# Patient Record
Sex: MALE | Race: WHITE | URBAN - NONMETROPOLITAN AREA
[De-identification: names, ages, dates, MRNs, and addresses within clinical notes are randomized per-mention and may not be internally consistent; named-entity substitution may affect disease eponyms.]

---

## 2018-03-01 ENCOUNTER — OFFICE VISIT (OUTPATIENT)
Dept: PRIMARY CARE CLINIC | Age: 63
End: 2018-03-01
Payer: COMMERCIAL

## 2018-03-01 VITALS
DIASTOLIC BLOOD PRESSURE: 78 MMHG | TEMPERATURE: 97.6 F | BODY MASS INDEX: 30.52 KG/M2 | OXYGEN SATURATION: 96 % | HEIGHT: 71 IN | RESPIRATION RATE: 18 BRPM | SYSTOLIC BLOOD PRESSURE: 122 MMHG | HEART RATE: 63 BPM | WEIGHT: 218 LBS

## 2018-03-01 DIAGNOSIS — Z76.89 ENCOUNTER TO ESTABLISH CARE: ICD-10-CM

## 2018-03-01 DIAGNOSIS — Z00.00 WELLNESS EXAMINATION: ICD-10-CM

## 2018-03-01 DIAGNOSIS — Z12.5 SCREENING FOR PROSTATE CANCER: ICD-10-CM

## 2018-03-01 DIAGNOSIS — Z13.220 SCREENING CHOLESTEROL LEVEL: ICD-10-CM

## 2018-03-01 PROCEDURE — 99386 PREV VISIT NEW AGE 40-64: CPT | Performed by: NURSE PRACTITIONER

## 2018-03-01 NOTE — PROGRESS NOTES
OFFICE VISIT:  3/4/18    Kaylin Hanna - : 1955      I appreciate the opportunity of participating in the care of Kaylin Hanna. He is a very pleasant 65 y.o. male who I had the opportunity of seeing in my office today, 3/4/18. Subjective  Aide Stockton normally goes by the name Polly Haddad, he is a pleasant cooperative 70-year-old male who reports overall good health. Patient tells me at one point he did have some concerns with borderline hypertension, but he started watching his sodium exercising and has not had any additional elevated blood pressure readings. He has no exertional chest pain, pressure, burning or squeezing. He is able to lie flat without evidence of orthopnea or paroxysmal nocturnal dyspnea. No symptomatic tachy- or tony-arrhythmia. No numbness or weakness to suggest cerebrovascular accident or transient ischemic attack. Patient does have several skin lesions likely associated with sun exposure. He has had somatic keratosis removed and does have several benign appearing lesions on his for head and upper body. I did recommend a few of them to be watched closely and maybe consider dermatology evaluation. The patient does not smoke or drink alcohol. He does not have any other significant medical history or family history. He did have a right total hip replaced in  by Dr. Cyndee Benton and has not had any additional concerns. He is very active and exercises every day. And he is adherent to a healthy heart diet. Patient has not had any recent lab work and would like to have basic lab work completed today. Kaylin Hanna has the following history as recorded in [employer]: There are no active problems to display for this patient.     Past Medical History:   Diagnosis Date    Acid reflux     Actinic keratosis     History of elevated blood pressure while in hospital      Past Surgical History:   Procedure Laterality Date    JOINT REPLACEMENT Right 2008    Right Hip replacement / Sara Rubalcava [facility]uy     Family History   Problem Relation Age of Onset    Stroke Mother     Cancer Father      Thyroid     Social History   Substance Use Topics    Smoking status: Former Smoker     Types: Cigarettes    Smokeless tobacco: Never Used    Alcohol use No        No current outpatient prescriptions on file. No current facility-administered medications for this visit. Allergies: Patient has no known allergies. Objective  Vital Signs - /78   Pulse 63   Temp 97.6 °F (36.4 °C) (Temporal)   Resp 18   Ht 5' 10.5\" (1.791 m)   Wt 218 lb (98.9 kg)   SpO2 96%   BMI 30.84 kg/m²   General  Félix Braun is in no apparent distress. HEENT  The head normocephalic. Neck - Supple, without increased jugular venous pressure. Lungs - The lungs are clear. Cardiovascular  Rhythm is regular without appreciable murmur or rub. Extremities - There is no cyanosis, clubbing, or significant edema. Pulmonary  Effort appears normal.  Breath sounds normal.  No wheezes or rales. Abdomen  Soft, nontender, and nondistended. Bowel sounds are intact. Musculoskeletal  ROM appears normal.   Neurological  Cranial nerves II through XII are grossly intact. The patient moves all four extremities without hesitancy. Assessment:     1. Wellness examination  CBC    Comprehensive Metabolic Panel   2. Screening cholesterol level  Lipid Panel    PSA Screening   3. Screening for prostate cancer  PSA Screening   4. Encounter to establish care         Plan  1. Return for wellness visit in a year. 2. Lab work is ordered below  3. Continue 30 minutes of low impact aerobic exercise daily  4. Healthy heart diet  5. Avoid sun exposure, use sunscreen and wear a hat or appropriate clothing with potential sun exposure  6. Call with any problems, questions or concerns. Félix Braun was seen today for established new doctor. Diagnoses and all orders for this visit:    Wellness examination  -     CBC;  Future  - Comprehensive Metabolic Panel; Future    Screening cholesterol level  -     Lipid Panel; Future  -     PSA Screening; Future    Screening for prostate cancer  -     PSA Screening;  Future    Encounter to establish care      I appreciate the opportunity of participating in the care and treatment of this patient.       :  Renata Garcia, APRN

## 2018-03-05 ENCOUNTER — TELEPHONE (OUTPATIENT)
Dept: PRIMARY CARE CLINIC | Age: 63
End: 2018-03-05

## 2018-03-05 DIAGNOSIS — S33.5XXS LUMBAR SPRAIN, SEQUELA: Primary | ICD-10-CM

## 2018-03-05 RX ORDER — BACLOFEN 10 MG/1
10 TABLET ORAL 3 TIMES DAILY
Qty: 30 TABLET | Refills: 1 | Status: SHIPPED | OUTPATIENT
Start: 2018-03-05 | End: 2018-03-15

## 2018-03-05 RX ORDER — DICLOFENAC POTASSIUM 50 MG/1
50 TABLET, FILM COATED ORAL 3 TIMES DAILY
Qty: 30 TABLET | Refills: 3 | Status: SHIPPED | OUTPATIENT
Start: 2018-03-05 | End: 2019-04-09 | Stop reason: SDUPTHER

## 2018-04-04 DIAGNOSIS — Z12.5 SCREENING FOR PROSTATE CANCER: ICD-10-CM

## 2018-04-04 DIAGNOSIS — Z13.220 SCREENING CHOLESTEROL LEVEL: ICD-10-CM

## 2018-04-04 DIAGNOSIS — Z00.00 WELLNESS EXAMINATION: ICD-10-CM

## 2018-04-04 LAB
ALBUMIN SERPL-MCNC: 4.1 G/DL (ref 3.5–5.2)
ALP BLD-CCNC: 56 U/L (ref 40–130)
ALT SERPL-CCNC: 30 U/L (ref 5–41)
ANION GAP SERPL CALCULATED.3IONS-SCNC: 12 MMOL/L (ref 7–19)
AST SERPL-CCNC: 21 U/L (ref 5–40)
BILIRUB SERPL-MCNC: 0.3 MG/DL (ref 0.2–1.2)
BUN BLDV-MCNC: 12 MG/DL (ref 8–23)
CALCIUM SERPL-MCNC: 8.9 MG/DL (ref 8.8–10.2)
CHLORIDE BLD-SCNC: 102 MMOL/L (ref 98–111)
CHOLESTEROL, TOTAL: 187 MG/DL (ref 160–199)
CO2: 26 MMOL/L (ref 22–29)
CREAT SERPL-MCNC: 0.8 MG/DL (ref 0.5–1.2)
GFR NON-AFRICAN AMERICAN: >60
GLUCOSE BLD-MCNC: 121 MG/DL (ref 74–109)
HCT VFR BLD CALC: 46.9 % (ref 42–52)
HDLC SERPL-MCNC: 35 MG/DL (ref 55–121)
HEMOGLOBIN: 15.4 G/DL (ref 14–18)
LDL CHOLESTEROL CALCULATED: 118 MG/DL
MCH RBC QN AUTO: 29.7 PG (ref 27–31)
MCHC RBC AUTO-ENTMCNC: 32.8 G/DL (ref 33–37)
MCV RBC AUTO: 90.5 FL (ref 80–94)
PDW BLD-RTO: 11.9 % (ref 11.5–14.5)
PLATELET # BLD: 321 K/UL (ref 130–400)
PMV BLD AUTO: 9.8 FL (ref 9.4–12.4)
POTASSIUM SERPL-SCNC: 4.4 MMOL/L (ref 3.5–5)
PROSTATE SPECIFIC ANTIGEN: 2.49 NG/ML (ref 0–4)
RBC # BLD: 5.18 M/UL (ref 4.7–6.1)
SODIUM BLD-SCNC: 140 MMOL/L (ref 136–145)
TOTAL PROTEIN: 6.9 G/DL (ref 6.6–8.7)
TRIGL SERPL-MCNC: 169 MG/DL (ref 0–149)
WBC # BLD: 7.3 K/UL (ref 4.8–10.8)

## 2018-04-05 ENCOUNTER — TELEPHONE (OUTPATIENT)
Dept: PRIMARY CARE CLINIC | Age: 63
End: 2018-04-05

## 2019-04-09 ENCOUNTER — TELEPHONE (OUTPATIENT)
Dept: PRIMARY CARE CLINIC | Age: 64
End: 2019-04-09

## 2019-04-09 DIAGNOSIS — S39.012S LUMBAR STRAIN, SEQUELA: Primary | ICD-10-CM

## 2019-04-09 RX ORDER — DICLOFENAC POTASSIUM 50 MG/1
50 TABLET, FILM COATED ORAL 3 TIMES DAILY
Qty: 30 TABLET | Refills: 3 | Status: SHIPPED | OUTPATIENT
Start: 2019-04-09 | End: 2019-04-19

## 2019-04-09 RX ORDER — PREDNISONE 10 MG/1
TABLET ORAL
Qty: 42 TABLET | Refills: 0 | Status: SHIPPED | OUTPATIENT
Start: 2019-04-09

## 2019-04-09 RX ORDER — METAXALONE 800 MG/1
800 TABLET ORAL 3 TIMES DAILY
Qty: 30 TABLET | Refills: 0 | Status: SHIPPED | OUTPATIENT
Start: 2019-04-09 | End: 2019-04-19

## 2023-02-08 NOTE — PROGRESS NOTES
"Chief Complaint  Elevated PSA    Subjective          Kip Richardson presents to [facility] UROLOG[facility] [place]   Elevated PSA  Patient presents today with a presumed abnormality of the prostate gland, that is an elevated PSA.  This is been found in the context of prostate cancer screening.  The severity is best defined as a PSA 5.8 ng/mL.  This test was done approximately 8 days ago.  Patient denies any suspected systemic symptoms of prostate cancer such as weight loss, lower extremity edema, or skeletal pain that could be worrisome for metastases.  \  He is having difficulty maintaining an erection.  This has been going on for about 6 months but is gotten progressively worse.  He does notice decrease in number spontaneous erections he gets.      Current Outpatient Medications:   •  sildenafil (VIAGRA) 100 MG tablet, Take 1 tablet by mouth Take As Directed. 1-4 hours before sexual activity, Disp: 30 tablet, Rfl: 5  History reviewed. No pertinent past medical history.  Past Surgical History:   Procedure Laterality Date   • HIP SURGERY Right            Review of Systems  Review  of systems  Constitutional: Negative for chills and fever.   Gastrointestinal: Negative for abdominal pain, anal bleeding and blood in stool.   Genitourinary: Negative for flank pain and hematuria.      Objective   PHYSICAL EXAM  Vital Signs:   Temp 97.6 °F (36.4 °C)   Ht 180.3 cm (71\")   Wt 97.3 kg (214 lb 9.6 oz)   BMI 29.93 kg/m²     Physical Exam   The prostate is approximately 30 ml. It is Symmetric, with a Soft consistency. There are no nodules present.  and The gland is no more painful than expected.. The seminal vesicles are Palpably normal in size and without mass.          DATA  Result Review :              Results for orders placed or performed in visit on 02/13/23   POC Urinalysis Dipstick, Multipro    Specimen: Urine   Result Value Ref Range    Color Yellow Yellow, Straw, Dark Yellow, Mone    Clarity, UA Clear " Clear    Glucose, UA Negative Negative mg/dL    Bilirubin Negative Negative    Ketones, UA Trace (A) Negative    Specific Gravity  1.020 1.005 - 1.030    Blood, UA Negative Negative    pH, Urine 6.0 5.0 - 8.0    Protein, POC Negative Negative mg/dL    Urobilinogen, UA Normal Normal, 0.2 E.U./dL    Nitrite, UA Negative Negative    Leukocytes Negative Negative              2/2/2023     9.8%f/t       ASSESSMENT AND PLAN          Problem List Items Addressed This Visit    None  Visit Diagnoses     Elevated prostate specific antigen (PSA)    -  Primary    Relevant Orders    POC Urinalysis Dipstick, Multipro (Completed)    PSA Total, Reflex To Free    Erectile dysfunction, unspecified erectile dysfunction type        Relevant Medications    sildenafil (VIAGRA) 100 MG tablet      This represents an undiagnosed  problem with uncertain prognosis.  I discussed elevated PSA with the patient today.  We discussed that PSA is a protein measured in the bloodstream that comes exclusively from the prostate gland.  I mentioned to him that all men with a prostate gland will have a certain PSA level.  We discussed this number can be compared to all men, or men of a certain age.  We can also follow trend of PSA which is called the PSA velocity.  We discussed the prostate cancer is a possible cause of PSA elevation, but benign etiologies such as infection, enlargement, aging, and inflammation should also be considered.  There is also convincing evidence that some patients will have a PSA that waxes and wanes completely unrelated to symptomatic disease of the prostate for cancer.  We discussed that some patients with a normal PSA may also have prostate cancer.  The necessity of digital rectal exam is also discussed.  We discussed the role of free to total PSA ratio.  It is explained that this test is done in hopes of avoiding unnecessary biopsies, but that a few patients with prostate cancer will have false-negative results when measuring  "there free PSA.  We also discussed that PSA, in many patients, varies considerably for unexplained reasons.  Sometimes repeating the PSA in a few months gives time for a \"correction \" to baseline.  We have elected to repeat the total PSA.  We did discuss the natural course of prostate cancer in most patients.  It is explained that waiting to see what the results of this test*are very unlikely to affect the clinical course of the disease.  However, there are exceptions in the biology of each cancer.  The risks and possible benefits of transrectal ultrasound with biopsy of the prostate gland is also discussed.  I did explain that biopsy is the standard of care for diagnosing prostate cancer. The risks, alternatives, and benefits of this treatment recommendation are discussed.  I did answer all questions of the patient.      The patient and I discussed the likely etiology of his impotence.  We discussed diagnostic evaluation for erectile dysfunction is possible but usually does not change the management.  He understands that goal directed therapy is probably the best approach since no matter what the etiology, a PDE 5 inhibitor is the least invasive way to manage ED.  While there are risks to PDE 5 inhibitors which I discussed as described below, it was relayed to him that they probably provide the most \"natural\" erection.  Other options discussed included penile injections, urethral suppositories, vacuum erection device without or without the tension ring, and penile prosthesis.  The risks of headache, visual changes, hypotension, priapism, GI distress, myalgias, and the contraindication of nitrates were discussed with the patient.  The patient denies a history of nitrates either in long acting or prn use form.  He denies a history of MI, heart failure, or stroke in the last 6 months.  I discussed with him that alpha blockers should be taken at least 4 hours apart from the PDE 5 inhibitors.  He was given a " prescription. I reminded him that facial flushing is not usual with this medication, and that it is most effective when taken on an empty stomach without alcohol.  He understands that he needs to be forthright about using this medication in any medical situation, especially when giving nitrates is being considered.        FOLLOW UP     No follow-ups on file.        (Please note that portions of this note were completed with a voice recognition program.)  Evin Alva MD  02/13/23  14:22 CST

## 2023-02-13 ENCOUNTER — OFFICE VISIT (OUTPATIENT)
Dept: UROLOGY | Facility: CLINIC | Age: 68
End: 2023-02-13
Payer: MEDICARE

## 2023-02-13 VITALS — HEIGHT: 71 IN | WEIGHT: 214.6 LBS | BODY MASS INDEX: 30.04 KG/M2 | TEMPERATURE: 97.6 F

## 2023-02-13 DIAGNOSIS — N52.9 ERECTILE DYSFUNCTION, UNSPECIFIED ERECTILE DYSFUNCTION TYPE: ICD-10-CM

## 2023-02-13 DIAGNOSIS — R97.20 ELEVATED PROSTATE SPECIFIC ANTIGEN (PSA): Primary | ICD-10-CM

## 2023-02-13 LAB
BILIRUB BLD-MCNC: NEGATIVE MG/DL
CLARITY, POC: CLEAR
COLOR UR: YELLOW
GLUCOSE UR STRIP-MCNC: NEGATIVE MG/DL
KETONES UR QL: ABNORMAL
LEUKOCYTE EST, POC: NEGATIVE
NITRITE UR-MCNC: NEGATIVE MG/ML
PH UR: 6 [PH] (ref 5–8)
PROT UR STRIP-MCNC: NEGATIVE MG/DL
RBC # UR STRIP: NEGATIVE /UL
SP GR UR: 1.02 (ref 1–1.03)
UROBILINOGEN UR QL: NORMAL

## 2023-02-13 PROCEDURE — 99214 OFFICE O/P EST MOD 30 MIN: CPT | Performed by: UROLOGY

## 2023-02-13 PROCEDURE — 81001 URINALYSIS AUTO W/SCOPE: CPT | Performed by: UROLOGY

## 2023-02-13 RX ORDER — SILDENAFIL 100 MG/1
100 TABLET, FILM COATED ORAL TAKE AS DIRECTED
Qty: 30 TABLET | Refills: 5 | Status: SHIPPED | OUTPATIENT
Start: 2023-02-13

## 2023-03-15 ENCOUNTER — TELEPHONE (OUTPATIENT)
Dept: HEMATOLOGY | Age: 68
End: 2023-03-15

## 2023-03-15 NOTE — TELEPHONE ENCOUNTER
CALLED  [facility] AND HAD TO [facility] FOR NEW PT APPT DATE AND TIME. SENT NEW PT PACKET OUT ALSO.

## 2023-04-18 DIAGNOSIS — C43.4 MALIGNANT MELANOMA OF NECK (HCC): Primary | ICD-10-CM

## 2024-04-30 LAB
25(OH)D3 SERPL-MCNC: 46.4 NG/ML
ALBUMIN SERPL-MCNC: 4.2 G/DL (ref 3.5–5.2)
ALP SERPL-CCNC: 93 U/L (ref 40–130)
ALT SERPL-CCNC: 36 U/L (ref 5–41)
ANION GAP SERPL CALCULATED.3IONS-SCNC: 12 MMOL/L (ref 7–19)
AST SERPL-CCNC: 23 U/L (ref 5–40)
BASOPHILS # BLD: 0.1 K/UL (ref 0–0.2)
BASOPHILS NFR BLD: 1.1 % (ref 0–1)
BILIRUB SERPL-MCNC: <0.2 MG/DL (ref 0.2–1.2)
BUN SERPL-MCNC: 16 MG/DL (ref 8–23)
CALCIUM SERPL-MCNC: 9.6 MG/DL (ref 8.8–10.2)
CHLORIDE SERPL-SCNC: 104 MMOL/L (ref 98–111)
CHOLEST SERPL-MCNC: 195 MG/DL (ref 160–199)
CO2 SERPL-SCNC: 26 MMOL/L (ref 22–29)
CREAT SERPL-MCNC: 0.8 MG/DL (ref 0.5–1.2)
CREAT UR-MCNC: 28.1 MG/DL (ref 39–259)
EOSINOPHIL # BLD: 0.2 K/UL (ref 0–0.6)
EOSINOPHIL NFR BLD: 2.6 % (ref 0–5)
ERYTHROCYTE [DISTWIDTH] IN BLOOD BY AUTOMATED COUNT: 12 % (ref 11.5–14.5)
GLUCOSE SERPL-MCNC: 133 MG/DL (ref 74–109)
HBA1C MFR BLD: 6.6 % (ref 4–6)
HCT VFR BLD AUTO: 46.2 % (ref 42–52)
HDLC SERPL-MCNC: 32 MG/DL (ref 55–121)
HGB BLD-MCNC: 15.1 G/DL (ref 14–18)
IMM GRANULOCYTES # BLD: 0 K/UL
LDLC SERPL CALC-MCNC: 106 MG/DL
LYMPHOCYTES # BLD: 1.6 K/UL (ref 1.1–4.5)
LYMPHOCYTES NFR BLD: 22.7 % (ref 20–40)
MCH RBC QN AUTO: 30.2 PG (ref 27–31)
MCHC RBC AUTO-ENTMCNC: 32.7 G/DL (ref 33–37)
MCV RBC AUTO: 92.4 FL (ref 80–94)
MICROALBUMIN UR-MCNC: <1.2 MG/DL (ref 0–19)
MICROALBUMIN/CREAT UR-RTO: ABNORMAL MG/G
MONOCYTES # BLD: 0.7 K/UL (ref 0–0.9)
MONOCYTES NFR BLD: 9.1 % (ref 0–10)
NEUTROPHILS # BLD: 4.7 K/UL (ref 1.5–7.5)
NEUTS SEG NFR BLD: 64.4 % (ref 50–65)
PLATELET # BLD AUTO: 319 K/UL (ref 130–400)
PMV BLD AUTO: 9.7 FL (ref 9.4–12.4)
POTASSIUM SERPL-SCNC: 4.6 MMOL/L (ref 3.5–5)
PROT SERPL-MCNC: 7.1 G/DL (ref 6.6–8.7)
PSA SERPL-MCNC: 7.35 NG/ML (ref 0–4)
RBC # BLD AUTO: 5 M/UL (ref 4.7–6.1)
SODIUM SERPL-SCNC: 142 MMOL/L (ref 136–145)
TRIGL SERPL-MCNC: 286 MG/DL (ref 0–149)
TSH SERPL DL<=0.005 MIU/L-ACNC: 1.43 UIU/ML (ref 0.27–4.2)
WBC # BLD AUTO: 7.2 K/UL (ref 4.8–10.8)

## 2024-05-13 DIAGNOSIS — R97.20 ELEVATED PROSTATE SPECIFIC ANTIGEN (PSA): Primary | ICD-10-CM

## 2024-05-16 NOTE — PROGRESS NOTES
"Chief Complaint  Elevated PSA    Subjective          Kip Richardson presents to [facility] UROLOG[facility]   This is a gentleman that I initially saw in February 2023 for elevated PSA.  At that time he wanted to take a conservative posture with this which is of course are reasonable.  He is a very healthy 69-year-old gentleman.  He has no family history of prostate cancer.  Patient denies any possible systemic symptoms of prostate cancer such as weight loss, lower extremity edema, or skeletal pain that could be worrisome for systemic disease.    He returns to me today because his PSA continues to be elevated.  In fact he is at a new peak of 7.35.          Current Outpatient Medications:     levocetirizine (XYZAL) 5 MG tablet, Take 1 tablet by mouth Every Evening., Disp: , Rfl:     sildenafil (VIAGRA) 100 MG tablet, Take 1 tablet by mouth Take As Directed. 1-4 hours before sexual activity, Disp: 30 tablet, Rfl: 5    Triamcinolone Acetonide (Nasacort Allergy 24HR) 55 MCG/ACT nasal inhaler, Spray 2 sprays every day by intranasal route., Disp: , Rfl:   History reviewed. No pertinent past medical history.  Past Surgical History:   Procedure Laterality Date    HIP SURGERY Right     SKIN CANCER EXCISION             Review of Systems      Objective   PHYSICAL EXAM  Vital Signs:   Ht 180.3 cm (71\")   Wt 93.7 kg (206 lb 9.6 oz)   BMI 28.81 kg/m²     Physical Exam  The prostate is approximately 35 ml. It is Symmetric, with a Soft consistency. There are no nodules present.  and The gland is no more painful than expected.. The seminal vesicles are Palpably normal in size and without mass.      DATA  Result Review :              Results for orders placed or performed in visit on 05/30/24   POC Urinalysis Dipstick, Multipro    Specimen: Urine   Result Value Ref Range    Color Yellow Yellow, Straw, Dark Yellow, Mone    Clarity, UA Clear Clear    Glucose, UA Negative Negative mg/dL    Bilirubin Negative Negative    " Ketones, UA Negative Negative    Specific Gravity  1.010 1.005 - 1.030    Blood, UA Negative Negative    pH, Urine 7.0 5.0 - 8.0    Protein, POC Negative Negative mg/dL    Urobilinogen, UA 0.2 E.U./dL Normal, 0.2 E.U./dL    Nitrite, UA Negative Negative    Leukocytes Negative Negative                        ASSESSMENT AND PLAN          Problem List Items Addressed This Visit    None  Visit Diagnoses       Elevated prostate specific antigen (PSA)    -  Primary    Relevant Orders    POC Urinalysis Dipstick, Multipro (Completed)          We discussed the following points regarding prostate cancer screening and options for elevated PSA:    We talked about PSA screening for prostate cancer.  We talked about how screening should be done based on a shared decision model.  I explained that patients between the ages of 55-69 without risk factors appear to benefit from prostate cancer screening.  We also talked about prostate cancer screening can be considered somewhat controversial.  In fact, some societal recommendations no longer recommend prostate cancer screening.    We talked about the role of MRI which in this case would not be very helpful because he has had a total hip arthroplasty.  This typically limits visualization of prostate significantly.    We talked about risk stratification for patients who have adenocarcinoma the prostate.  I explained that active surveillance is often done for patients diagnosed with prostate cancer provided they have a low risk.  I explained that even some patients with favorable intermediate risk prostate cancer are reasonable candidates.  I explained the difference in active surveillance and watchful waiting.  At present he has no symptoms that would be concerning.    I did explain to him that his PSA could be indicative of prostate cancer.  I did explain that not all prostate cancer has a protracted course.  I explained that there is probably a optimal window for biopsy and treatment  that, if missed, could lead to metastasis and even death from prostate cancer.    We discussed genetic biomarker test such as ExoDx, Select MDX and 4K test.  I explained that my experience is let me toward thinking the select MDX test is probably the optimal 1.  I explained how this gives us some idea of the chance he has prostate cancer, but more importantly whether he is likely to have an intermediate or high risk prostate cancer.    I did tell him I thought his best option would be to do a prostate biopsy but he has concerns about the risks of this.  We especially talked about risk of postoperative infection that can lead to sepsis.  We also talked about the possibility of false negative result.  I do think he has a good understanding of prostate cancer screening and the role of PSA.  I do think he has an understanding of his options that are available.  I also think this is a reasonable decision based upon him being well-informed and willing to follow-up.        I spent 30 minutes caring for Kip on this date of service. This time includes time spent by me in the following activities:preparing for the visit, reviewing tests, obtaining and/or reviewing a separately obtained history, performing a medically appropriate examination and/or evaluation , counseling and educating the patient/family/caregiver, and referring and communicating with other health care professionals   FOLLOW UP     Return in about 3 months (around 8/30/2024) for Possbile Select MDX.        (Please note that portions of this note were completed with a voice recognition program.)  Evin Alva MD  05/31/24  07:19 CDT

## 2024-05-30 ENCOUNTER — OFFICE VISIT (OUTPATIENT)
Dept: UROLOGY | Facility: CLINIC | Age: 69
End: 2024-05-30
Payer: MEDICARE

## 2024-05-30 VITALS — BODY MASS INDEX: 28.92 KG/M2 | WEIGHT: 206.6 LBS | HEIGHT: 71 IN

## 2024-05-30 DIAGNOSIS — R97.20 ELEVATED PROSTATE SPECIFIC ANTIGEN (PSA): Primary | ICD-10-CM

## 2024-05-30 LAB
BILIRUB BLD-MCNC: NEGATIVE MG/DL
CLARITY, POC: CLEAR
COLOR UR: YELLOW
GLUCOSE UR STRIP-MCNC: NEGATIVE MG/DL
KETONES UR QL: NEGATIVE
LEUKOCYTE EST, POC: NEGATIVE
NITRITE UR-MCNC: NEGATIVE MG/ML
PH UR: 7 [PH] (ref 5–8)
PROT UR STRIP-MCNC: NEGATIVE MG/DL
RBC # UR STRIP: NEGATIVE /UL
SP GR UR: 1.01 (ref 1–1.03)
UROBILINOGEN UR QL: NORMAL

## 2024-05-30 PROCEDURE — 1160F RVW MEDS BY RX/DR IN RCRD: CPT | Performed by: UROLOGY

## 2024-05-30 PROCEDURE — 1159F MED LIST DOCD IN RCRD: CPT | Performed by: UROLOGY

## 2024-05-30 PROCEDURE — 99214 OFFICE O/P EST MOD 30 MIN: CPT | Performed by: UROLOGY

## 2024-05-30 PROCEDURE — 81003 URINALYSIS AUTO W/O SCOPE: CPT | Performed by: UROLOGY

## 2024-05-30 RX ORDER — TRIAMCINOLONE ACETONIDE 55 UG/1
SPRAY, METERED NASAL
COMMUNITY

## 2024-05-30 RX ORDER — LEVOCETIRIZINE DIHYDROCHLORIDE 5 MG/1
5 TABLET, FILM COATED ORAL EVERY EVENING
COMMUNITY

## 2024-08-20 NOTE — PROGRESS NOTES
"Chief Complaint  Elevated PSA    Subjective          Kip Richardson presents to [facility] UROLOG[facility] to follow-up his elevated PSA.  Insert prostate cancer symptoms          Current Outpatient Medications:     levocetirizine (XYZAL) 5 MG tablet, Take 1 tablet by mouth Every Evening., Disp: , Rfl:     sildenafil (VIAGRA) 100 MG tablet, Take 1 tablet by mouth Take As Directed. 1-4 hours before sexual activity, Disp: 30 tablet, Rfl: 5    Triamcinolone Acetonide (Nasacort Allergy 24HR) 55 MCG/ACT nasal inhaler, Spray 2 sprays every day by intranasal route., Disp: , Rfl:   History reviewed. No pertinent past medical history.  Past Surgical History:   Procedure Laterality Date    HIP SURGERY Right     SKIN CANCER EXCISION             Review of Systems      Objective   PHYSICAL EXAM  Vital Signs:   Ht 182.9 cm (72\")   Wt 94.6 kg (208 lb 9.6 oz)   BMI 28.29 kg/m²     Physical Exam      DATA  Result Review :              Results for orders placed or performed in visit on 09/11/24   PSA Diagnostic    Specimen: Blood   Result Value Ref Range    PSA 8.820 (H) 0.000 - 4.000 ng/mL             Lab Results   Component Value Date    PSA 8.820 (H) 09/11/2024    PSA 7.35 (H) 04/30/2024    PSA 2.49 04/04/2018                ASSESSMENT AND PLAN          Problem List Items Addressed This Visit    None  Visit Diagnoses       Elevated prostate specific antigen (PSA)    -  Primary        We discussed the following options considering his PSA still elevated:    Multiparametric MRI: We talked about  MRI of the prostate because studies, especially the PROMIS study, has shown that standard biopsy may miss up to half of clinically significant disease compared with 5 mm template mapping biopsies.  Furthermore 3 studies (PRECISION,4M&MRI-FIRST) have shown that MRI targeted biopsy is detect more clinically significant disease and reduce over detection of indolent disease whilst allowing between 1/3-1/2 of men to avoid immediate " "biopsy.  However, I did explain that studies have also shown that one fourth of the Emma grade 3+4 lesions (which may or may not be clinically significant) are not detected by the MRI.  However, it is noted that this patient has undergone total hip arthroplasty which may limit the utility of this option.    We talked about biomarker use with prostate cancer screening.  I did tell him that there are no societal recommendations that currently recommend routine use of these biomarkers.  I explained that in my experience the ExoDx prostate test is 1 that I have started to use more.  I explained that the urine test that is appropriate for men whose PSA is between 2 and 10.  I explained that the test may help patients avoid biopsy.  I explained that there is a study that shows up to 30% reduction of \"unnecessary \"biopsies.  It also avoids the risks of biopsy such as infection, bleeding , and bothersome urinary tract symptoms.  I explained that it basically a test using 3 RNA biomarkers that are associated with high-grade, (more aggressive) prostate cancers.  It does have the advantage of not requiring a digital rectal exam.  I explained that a personalize risk score ranging from 0-100 (EPI) helps assess the manage risk for high-grade, clinically significant prostate cancer.  A cutoff point of 15.6 is associated with a lower likelihood of high-grade prostate cancer.  On the other hand, and EPI result above that point has an increased likelihood of high-grade prostate cancer.  While there have been peer-reviewed validation studies and clinical utility studies, there is a lack of societal recommendations  and uniform stances on its usage.  While I think it is reasonable to use this in this situation, lack of long-term studies limit widespread recommendations and there is a possibility of delaying diagnosis of high risk prostate cancers by using this for a definitive clinical decision.  I think the patient has a good " "understanding of this discussion and would like for me to obtain the study.    We discussed prostate biopsy.  I explained this is the definitive procedure to determine whether or not a patient has prostate cancer.  I did explain approximately 10% of patients diagnosed with prostate cancer have a prior history of a negative biopsy so it is possible to get a false negative result.  Also explained that a biopsy could result in diagnosis of a \"clinically insignificant \"prostate cancer that may never need treatment.  We briefly talked about the possibility of using active surveillance in such settings.  Nonetheless these patients are submitted to further biopsy, possible anxiety, and potential delay in the treatment of a clinically significant cancer.  Risks including infection, sepsis, hematuria, hematospermia, urinary retention and transient erectile dysfunction.          I spent 35 minutes caring for Kip on this date of service. This time includes time spent by me in the following activities:preparing for the visit, reviewing tests, obtaining and/or reviewing a separately obtained history, counseling and educating the patient/family/caregiver, ordering medications, tests, or procedures, referring and communicating with other health care professionals , and documenting information in the medical record  FOLLOW UP     No follow-ups on file.        (Please note that portions of this note were completed with a voice recognition program.)  Evin Alva MD  09/12/24  18:19 CDT  "

## 2024-09-09 ENCOUNTER — TELEPHONE (OUTPATIENT)
Dept: UROLOGY | Facility: CLINIC | Age: 69
End: 2024-09-09
Payer: COMMERCIAL

## 2024-09-09 NOTE — TELEPHONE ENCOUNTER
Called Patient to remind them to get PSA prior to appointment.  Spoke with Patient.  If patient calls back it is ok for the HUB to tell the pt the message.  Pt going to OP lab on Wednesday

## 2024-09-11 ENCOUNTER — LAB (OUTPATIENT)
Dept: LAB | Facility: HOSPITAL | Age: 69
End: 2024-09-11
Payer: MEDICARE

## 2024-09-11 DIAGNOSIS — R97.20 ELEVATED PROSTATE SPECIFIC ANTIGEN (PSA): ICD-10-CM

## 2024-09-11 LAB — PSA SERPL-MCNC: 8.82 NG/ML (ref 0–4)

## 2024-09-11 PROCEDURE — 36415 COLL VENOUS BLD VENIPUNCTURE: CPT

## 2024-09-11 PROCEDURE — 84153 ASSAY OF PSA TOTAL: CPT

## 2024-09-12 ENCOUNTER — OFFICE VISIT (OUTPATIENT)
Dept: UROLOGY | Facility: CLINIC | Age: 69
End: 2024-09-12
Payer: MEDICARE

## 2024-09-12 VITALS — BODY MASS INDEX: 28.25 KG/M2 | HEIGHT: 72 IN | WEIGHT: 208.6 LBS

## 2024-09-12 DIAGNOSIS — R97.20 ELEVATED PROSTATE SPECIFIC ANTIGEN (PSA): Primary | ICD-10-CM

## 2024-09-12 PROCEDURE — 1160F RVW MEDS BY RX/DR IN RCRD: CPT | Performed by: UROLOGY

## 2024-09-12 PROCEDURE — 99214 OFFICE O/P EST MOD 30 MIN: CPT | Performed by: UROLOGY

## 2024-09-12 PROCEDURE — 1159F MED LIST DOCD IN RCRD: CPT | Performed by: UROLOGY

## 2024-10-08 ENCOUNTER — TELEPHONE (OUTPATIENT)
Dept: UROLOGY | Facility: CLINIC | Age: 69
End: 2024-10-08
Payer: COMMERCIAL

## 2024-10-08 DIAGNOSIS — R97.20 ELEVATED PROSTATE SPECIFIC ANTIGEN (PSA): Primary | ICD-10-CM

## 2024-10-08 NOTE — TELEPHONE ENCOUNTER
----- Message from Evin Alav sent at 10/7/2024 11:40 AM CDT -----  I called patient discussed his results with him.  His ExoDx is slightly elevated at 26.  This puts him in a group of patients its about 25% risk for high-grade prostate cancer.  After discussion with him we have elected to proceed in 6 months with a repeat PSA.  If that PSA has not improved considerably then we would do an MRI on him.  He has had a hip replacement but this was done in 2008.  It may affect the quality of the MRI we have found it beneficial in patients with hip replacement to still try to image them.

## 2024-10-08 NOTE — TELEPHONE ENCOUNTER
Called patient and spoke to him about making a 6 month follow-up appointment with Dr. Alva with a PSA prior.  Patient said he would be out of town through most of April and asked if we could make the appointment for the first week of May.  I scheduled him for 5/6/25 at 10:40am.  He said that he would have his PSA done at [facility].  I told him I would make sure there was an order in for him.  Patient verbally agreed.

## 2025-04-30 ENCOUNTER — TELEPHONE (OUTPATIENT)
Dept: UROLOGY | Facility: CLINIC | Age: 70
End: 2025-04-30
Payer: COMMERCIAL

## 2025-06-26 ENCOUNTER — LAB (OUTPATIENT)
Dept: LAB | Facility: HOSPITAL | Age: 70
End: 2025-06-26
Payer: MEDICARE

## 2025-06-26 DIAGNOSIS — R97.20 ELEVATED PROSTATE SPECIFIC ANTIGEN (PSA): ICD-10-CM

## 2025-06-26 LAB — PSA SERPL-MCNC: 10.5 NG/ML (ref 0–4)

## 2025-06-26 PROCEDURE — 36415 COLL VENOUS BLD VENIPUNCTURE: CPT

## 2025-06-26 PROCEDURE — 84153 ASSAY OF PSA TOTAL: CPT

## 2025-07-01 NOTE — PROGRESS NOTES
"Chief Complaint  Elevated PSA    Subjective          Kip Richardson presents to [facility] UROLOGY   Mr. Richardson returns today for his elevated PSA.  Really had no changes in his voiding symptoms.  Patient denies any possible systemic symptoms of prostate cancer such as weight loss, lower extremity edema, or skeletal pain that could be worrisome for systemic disease.    He has minimal sexual dysfunction.  He does take sildenafil occasionally and says this works very well for him.  Usually does not have to take anything at all though.                Current Outpatient Medications:     levocetirizine (XYZAL) 5 MG tablet, Take 1 tablet by mouth Every Evening., Disp: , Rfl:     sildenafil (VIAGRA) 100 MG tablet, Take 1 tablet by mouth Take As Directed. 1-4 hours before sexual activity, Disp: 30 tablet, Rfl: 5    Triamcinolone Acetonide (Nasacort Allergy 24HR) 55 MCG/ACT nasal inhaler, Spray 2 sprays every day by intranasal route., Disp: , Rfl:   History reviewed. No pertinent past medical history.  Past Surgical History:   Procedure Laterality Date    HIP SURGERY Right     SKIN CANCER EXCISION             Review of Systems  Review  of systems  Constitutional: Negative for chills and fever.   Gastrointestinal: Negative for abdominal pain, anal bleeding and blood in stool.   Genitourinary: Negative for flank pain and hematuria.      Objective   PHYSICAL EXAM  Vital Signs:   Temp 97.5 °F (36.4 °C)   Ht 182.9 cm (72\")   Wt 94.6 kg (208 lb 9.6 oz)   BMI 28.29 kg/m²     Physical Exam      DATA  Result Review :              Results for orders placed or performed in visit on 07/22/25   POC Urinalysis Dipstick, Multipro    Collection Time: 07/22/25 10:29 AM    Specimen: Urine   Result Value Ref Range    Color Yellow Yellow, Straw, Dark Yellow, Mone    Clarity, UA Clear Clear    Glucose,  mg/dL (A) Negative mg/dL    Bilirubin Negative Negative    Ketones, UA Negative Negative    Specific Gravity  1.020 " 1.005 - 1.030    Blood, UA Negative Negative    pH, Urine 7.5 5.0 - 8.0    Protein, POC Negative Negative mg/dL    Urobilinogen, UA 0.2 E.U./dL Normal, 0.2 E.U./dL    Nitrite, UA Negative Negative    Leukocytes Negative Negative             Lab Results   Component Value Date    PSA 10.500 (H) 06/26/2025    PSA 8.820 (H) 09/11/2024    PSA 7.35 (H) 04/30/2024    PSA 2.49 04/04/2018    PSA 2.10 10/05/2015                  ASSESSMENT AND PLAN          Problem List Items Addressed This Visit    None  Visit Diagnoses         Elevated prostate specific antigen (PSA)    -  Primary    Relevant Orders    POC Urinalysis Dipstick, Multipro (Completed)          This patient has elevated PSA with no prior biopsy .  He does have a prior history of total hip arthroplasty which may affect the study.  Nonetheless if we do see a lesion we would preferably do a targeted biopsy.  However, he is very reluctant to undergo biopsy.  He has a few friends that have undergone treatment for prostate cancer and have had significant quality of life changes.  Mr. Weller is very fortunate to be healthy and very active.  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  He plays golf often.  He is still sexually active.  He somewhat reluctantly agreed to a multi parametric magnetic resonance imaging study of the pelvis with and without gadolinium contrast using specific prostate protocol.  It is explained that abnormalities can be seen on a 3 Radha magnet based upon water content and increased contrast enhancement suggesting neovascularity.  Such images can then be used with the [employer] system to allow fusion of the images with live transrectal ultrasound images to increase the accuracy of biopsy.  Another advantage of this system is that it does appear to be more likely to show high risk prostate cancer lesions which would be very important to identify since  these are likely to be more aggressive.  The risks of prostate ultrasound and biopsy including infection with or without sepsis, hematuria, amount aspermia, and rectal bleeding are also discussed.  Transient erectile dysfunction is also included in the discussion.  The patient will proceed.    His ExoDX was 26 suggesting risk of clinically significant PCa is between 20-30%. I reminded him of this again.         FOLLOW UP     No follow-ups on file.        (Please note that portions of this note were completed with a voice recognition program.)  Evin Alva MD  07/23/25  16:22 CDT

## 2025-07-22 ENCOUNTER — OFFICE VISIT (OUTPATIENT)
Dept: UROLOGY | Facility: CLINIC | Age: 70
End: 2025-07-22
Payer: MEDICARE

## 2025-07-22 VITALS — HEIGHT: 72 IN | WEIGHT: 208.6 LBS | TEMPERATURE: 97.5 F | BODY MASS INDEX: 28.25 KG/M2

## 2025-07-22 DIAGNOSIS — R97.20 ELEVATED PROSTATE SPECIFIC ANTIGEN (PSA): Primary | ICD-10-CM

## 2025-07-22 LAB
BILIRUB BLD-MCNC: NEGATIVE MG/DL
CLARITY, POC: CLEAR
COLOR UR: YELLOW
GLUCOSE UR STRIP-MCNC: ABNORMAL MG/DL
KETONES UR QL: NEGATIVE
LEUKOCYTE EST, POC: NEGATIVE
NITRITE UR-MCNC: NEGATIVE MG/ML
PH UR: 7.5 [PH] (ref 5–8)
PROT UR STRIP-MCNC: NEGATIVE MG/DL
RBC # UR STRIP: NEGATIVE /UL
SP GR UR: 1.02 (ref 1–1.03)
UROBILINOGEN UR QL: ABNORMAL

## 2025-07-24 DIAGNOSIS — R97.20 ELEVATED PROSTATE SPECIFIC ANTIGEN (PSA): Primary | ICD-10-CM

## 2025-08-19 ENCOUNTER — TELEPHONE (OUTPATIENT)
Dept: UROLOGY | Facility: CLINIC | Age: 70
End: 2025-08-19
Payer: MEDICARE

## 2025-08-19 DIAGNOSIS — R97.20 ELEVATED PROSTATE SPECIFIC ANTIGEN (PSA): Primary | ICD-10-CM

## 2025-08-19 RX ORDER — ALPRAZOLAM 2 MG/1
2 TABLET ORAL ONCE
Qty: 1 TABLET | Refills: 0 | Status: SHIPPED | OUTPATIENT
Start: 2025-08-19 | End: 2025-08-19

## 2025-08-20 ENCOUNTER — HOSPITAL ENCOUNTER (OUTPATIENT)
Dept: MRI IMAGING | Facility: HOSPITAL | Age: 70
Discharge: HOME | End: 2025-08-20
Admitting: UROLOGY
Payer: MEDICARE

## 2025-08-20 DIAGNOSIS — R97.20 ELEVATED PROSTATE SPECIFIC ANTIGEN (PSA): ICD-10-CM

## 2025-08-20 PROCEDURE — 25510000001 GADOPICLENOL 0.5 MMOL/ML SOLUTION: Performed by: UROLOGY

## 2025-08-20 PROCEDURE — 72197 MRI PELVIS W/O & W/DYE: CPT

## 2025-08-20 PROCEDURE — A9573 GADOPICLENOL 0.5 MMOL/ML SOLUTION: HCPCS | Performed by: UROLOGY

## 2025-08-20 RX ADMIN — GADOPICLENOL 10 ML: 485.1 INJECTION INTRAVENOUS at 15:36

## 2025-08-21 ENCOUNTER — RESULTS FOLLOW-UP (OUTPATIENT)
Dept: UROLOGY | Facility: CLINIC | Age: 70
End: 2025-08-21
Payer: MEDICARE